# Patient Record
Sex: FEMALE | Race: WHITE | NOT HISPANIC OR LATINO | Employment: FULL TIME | URBAN - NONMETROPOLITAN AREA
[De-identification: names, ages, dates, MRNs, and addresses within clinical notes are randomized per-mention and may not be internally consistent; named-entity substitution may affect disease eponyms.]

---

## 2018-09-10 ENCOUNTER — OFFICE VISIT (OUTPATIENT)
Dept: RETAIL CLINIC | Facility: CLINIC | Age: 38
End: 2018-09-10

## 2018-09-10 VITALS
HEART RATE: 98 BPM | DIASTOLIC BLOOD PRESSURE: 96 MMHG | OXYGEN SATURATION: 98 % | TEMPERATURE: 98 F | SYSTOLIC BLOOD PRESSURE: 140 MMHG

## 2018-09-10 DIAGNOSIS — B01.9 VARICELLA WITHOUT COMPLICATION: Primary | ICD-10-CM

## 2018-09-10 PROCEDURE — 99213 OFFICE O/P EST LOW 20 MIN: CPT | Performed by: NURSE PRACTITIONER

## 2018-09-10 RX ORDER — ACYCLOVIR 400 MG/1
800 TABLET ORAL 4 TIMES DAILY
Qty: 40 TABLET | Refills: 0 | Status: SHIPPED | OUTPATIENT
Start: 2018-09-10 | End: 2018-09-15

## 2018-09-10 NOTE — PROGRESS NOTES
"Subjective   Luly Diamond is a 38 y.o. female.     Luly presents today with a rash.  She reports that she started to feel bad on Thursday or Friday of last week with bad headache, malaise, and possible fever.  She participated in a 5k walk and felt this was difficult for her, which she felt was odd because she walks frequently.  She noticed that day a small itchy rash to her left upper abdomen.  Last night she noticed that rash had worsened and spread to legs, arms, face, and back.  She denies any new exposures to medication, foods, lotions, etc.  She does report a friend of hers has recently been diagnosed with shingles and she was around this person on the Wednesday just prior to her symptoms starting but also the week prior.  She has never had a case of chicken pox and has not been vaccinated for this.  She reports if she scratches lesions, they will open to drain a \"pus\" which she describes as a white/clear, thin drainage.       Rash   This is a new problem. The current episode started in the past 7 days. The rash is diffuse. The rash is characterized by itchiness. She was exposed to an ill contact (Exposed to friend with Shingles more than once over the past 1-2 weeks.). Associated symptoms include a fever (possible). Pertinent negatives include no diarrhea, rhinorrhea, sore throat or vomiting. (Malaise  ) Past treatments include nothing.        The following portions of the patient's history were reviewed and updated as appropriate: allergies, current medications, past family history, past medical history, past social history, past surgical history and problem list.    Review of Systems   Constitutional: Positive for fever (possible).   HENT: Negative for rhinorrhea and sore throat.    Eyes: Positive for itching.   Respiratory: Negative.    Gastrointestinal: Negative for diarrhea, nausea and vomiting.   Skin: Positive for rash.   Neurological: Positive for headache.       Objective   Physical Exam "   Constitutional: She appears well-developed and well-nourished. She does not appear ill. No distress.   HENT:   Right Ear: External ear normal. Tympanic membrane is not erythematous and not bulging.   Left Ear: External ear normal. Tympanic membrane is not erythematous and not bulging.   Mouth/Throat: Oropharynx is clear and moist and mucous membranes are normal. No oral lesions. No oropharyngeal exudate.   Neck: Neck supple.   Cardiovascular: Normal rate, regular rhythm and normal heart sounds.  Exam reveals no gallop and no friction rub.    No murmur heard.  Pulmonary/Chest: Effort normal and breath sounds normal. No respiratory distress. She has no wheezes. She has no rales.   Lymphadenopathy:     She has no cervical adenopathy (Tonsillar nodes palpable).   Neurological: She is alert.   Skin: Skin is warm and dry. Rash noted. Rash is macular, maculopapular and vesicular. She is not diaphoretic.   First lesions to left upper abdomen, small maculopapular with crusted center. 2-3 lesions in this area.  Right side of face, noticed 1 maculopapular area and 1 macular lesion.  Bilateral lower extremities there are several small vesicular lesions. 2 digits to the right foot present with 2 flat macular lesions. Different stages present    Psychiatric: She has a normal mood and affect. Her behavior is normal.         Assessment/Plan   Luly was seen today for rash.    Diagnoses and all orders for this visit:    Varicella without complication    Other orders  -     acyclovir (ZOVIRAX) 400 MG tablet; Take 2 tablets by mouth 4 (Four) Times a Day for 5 days. Take no more than 5 doses a day.      Patient instructions given regarding chicken pox illness and risks of spreading.    May use antihistamine for itchiness; Ibuprofen and/or Tylenol for headache  Monitor symptoms.  If there is eye involvement, shortness of breath, coughing up sputum, worsening headache, or fever returns. Needs to be re-evaluated asap.  Work note  given for this week.  Informed she is contagious until all lesions are crusted over.

## 2018-09-10 NOTE — PATIENT INSTRUCTIONS
"Airborne Precautions  Germs that spread through the air are called airborne germs. Airborne precautions are rules that help to keep those germs from spreading from one person to another person.  Rules for patients  · You will be treated in a private room.  ? Keep the door to your room closed.  ? Wear a mask in your room if told by your nurse.  ? Check with your nurse before you leave your room.  · Wear a mask if you go to another part of the hospital or clinic. Make sure the mask fits tightly.  · Cover your mouth with a tissue when you cough.  · Cover your mouth and nose with a tissue when you sneeze.  · Wash your hands often with soap and water. If you cannot use soap and water, use hand .  Rules for visitors  · Check with a nurse before you go into a room that has a sign that says \"Airborne Precautions.\"  · If a nurse says that you can go in the room, wash your hands and wear a mask over your nose and mouth. Make sure the mask fits tightly.  · Do not take off your mask in the room.  · Do not eat or drink in the room.  · Do not use or touch any items in the room unless you ask a nurse first.  · Right after you leave the room:  1. Take off your mask. Throw it in the trash.  2. Wash your hands with soap and water. If you cannot use soap and water, use hand .  Summary  · Airborne precautions are rules to help prevent airborne germs from spreading.  · Wearing a mask and washing hands can help protect you and others from getting sick.  This information is not intended to replace advice given to you by your health care provider. Make sure you discuss any questions you have with your health care provider.  Document Released: 06/05/2009 Document Revised: 08/22/2017 Document Reviewed: 08/22/2017  [employer] Patient Education © 2017 [employer]      Chickenpox, Adult  Chickenpox is an infection that is caused by the varicella-zoster virus. The infection causes an itchy rash that turns into " blisters, which eventually scab over. This virus spreads easily from person to person (is contagious). It starts to be contagious 1-2 days before the rash appears. It remains contagious until the blisters become crusted. Chickenpox can be very serious for adults. Complications of chickenpox include:  · Pneumonia.  · Skin infection.  · Brain infection (encephalitis).  · A bloodstream infection (sepsis).  · Bleeding problems.  · Problems with balance and muscle control (cerebellar ataxia).  · Having a baby with a birth defect, if you are pregnant.    If you have had chickenpox once, you probably will not get it again. If you are exposed to chickenpox and have never had the illness or the vaccine, you may develop the illness within 21 days.  What are the causes?  This condition is caused by the spread of the virus. The virus may be spread:  · When an infected person coughs or sneezes, releasing tiny droplets into the air.  · When a person comes into contact with fluids from the chickenpox rash.    What increases the risk?  This condition is more likely to develop in:  · People who have never had chickenpox.  · People who have never been vaccinated.  · Health care workers.  · College students.  · People in the .  · People who live in an institution.  · Teachers.  · People who have a weak body defense system (immune system).    What are the signs or symptoms?  Symptoms of chickenpox are usually worse in adults and include:  · An itchy rash that changes over time.  ? The rash starts as red spots that become bumps.  ? The bumps turn into fluid-filled blisters.  ? The blisters turn into scabs, usually about 3-7 days after the rash begins.  · Body aches and pain.  · Headache.  · Tiredness.  · Fever.    How is this diagnosed?  This condition is diagnosed based on your medical history and symptoms, as well as a physical exam. You may also have blood tests or a culture of the rash to confirm the diagnosis.  How is this  treated?  Treatment for this condition may include:  · Taking medicine to shorten how long the illness lasts and to reduce its severity.  · Applying calamine lotion to relieve itchiness.  · Using baking soda or oatmeal baths to soothe itchy skin.  · Using a medicine that reduces itching (antihistamine).  · Taking antibiotic medicines if a bacterial infection develops.    Follow these instructions at home:  Pain, itching, and discomfort  · Try to stay cool and out of the sun. Sweating and being hot can make itching worse.  · Cool baths can be soothing. Try adding baking soda or oatmeal to the water to reduce itching. Do not bathe in hot water.  · Apply cool cloths (compresses) to itchy areas as told by your health care provider.  · Do not eat or drink spicy, salty, or acidic things if you have blisters in your mouth. Soft, bland, and cold foods and beverages will be easiest to swallow.  · Do not scratch or pick at the rash.  Medicines  · Take or apply over-the-counter and prescription medicines only as told by your health care provider. This includes any antihistamines.  · If you were prescribed an antibiotic medicine, take it as told by your health care provider. Do not stop using the medicine even if your condition improves.  Preventing infection  · While you are contagious, avoid being around:  ? Pregnant women.  ? Infants.  ? People receiving cancer treatments or long-term steroids.  ? People with weak immune systems.  ? Older people.  ? Anyone who has not had chickenpox.  ? Anyone who has not been vaccinated for chickenpox.  · Have any person who has been exposed to your chickenpox call a health care provider, who may recommend vaccination, if the person:  ? Has not had it before.  ? Has not been vaccinated against it.  ? Has a weak immune system.  ? Is pregnant.  · Wash your hands often. This lowers your chance of getting a bacterial skin infection and passing the virus to others.  General instructions  · Drink  enough fluid to keep your urine clear or pale yellow.  · Keep all follow-up visits as told by your health care provider. This is important.  How is this prevented?  Being vaccinated is the best way to prevent chickenpox.  Contact a health care provider if:  · You have a fever.  · You develop signs of infection. Watch for:  ? Yellowish-white fluid coming from rash blisters.  ? Areas of the skin that are warm, red, or tender.  · You develop a cough.  · Your urine is darker than normal.  Get help right away if:  · You cannot stop vomiting.  · You feel disoriented or confused.  · You are very sleepy.  · You have:  ? A stiff neck.  ? A seizure.  ? Trouble walking or keeping your balance.  ? Chest pain.  ? Trouble breathing or you have fast breathing.  ? Blood in your urine or stool.  ? Eye pain, red eyes, or decreased vision.  ? A severe headache.  ? Severe joint pain or stiffness.  · Your skin is bruising or your blisters are bleeding.  · You develop blisters in your eye.  · You have a fever and your symptoms suddenly get worse.  This information is not intended to replace advice given to you by your health care provider. Make sure you discuss any questions you have with your health care provider.  Document Released: 09/26/2009 Document Revised: 08/16/2017 Document Reviewed: 05/31/2017  [employer] Interactive Patient Education © 2018 [employer]

## 2023-09-06 ENCOUNTER — OFFICE VISIT (OUTPATIENT)
Dept: ENT CLINIC | Age: 43
End: 2023-09-06
Payer: COMMERCIAL

## 2023-09-06 ENCOUNTER — PROCEDURE VISIT (OUTPATIENT)
Dept: ENT CLINIC | Age: 43
End: 2023-09-06

## 2023-09-06 VITALS
HEIGHT: 72 IN | DIASTOLIC BLOOD PRESSURE: 74 MMHG | SYSTOLIC BLOOD PRESSURE: 128 MMHG | WEIGHT: 293 LBS | BODY MASS INDEX: 39.68 KG/M2

## 2023-09-06 DIAGNOSIS — H92.02 LEFT EAR PAIN: Primary | ICD-10-CM

## 2023-09-06 DIAGNOSIS — H60.392 CHRONIC BACTERIAL OTITIS EXTERNA OF LEFT EAR: Primary | ICD-10-CM

## 2023-09-06 PROCEDURE — NBSRV NON-BILLABLE SERVICE: Performed by: AUDIOLOGIST

## 2023-09-06 PROCEDURE — 99203 OFFICE O/P NEW LOW 30 MIN: CPT | Performed by: PHYSICIAN ASSISTANT

## 2023-09-06 RX ORDER — CEPHALEXIN 500 MG/1
CAPSULE ORAL
COMMUNITY
Start: 2023-08-25

## 2023-09-06 RX ORDER — EPINEPHRINE 0.3 MG/.3ML
INJECTION SUBCUTANEOUS
COMMUNITY
Start: 2021-12-15

## 2023-09-06 RX ORDER — MELOXICAM 15 MG/1
15 TABLET ORAL DAILY
COMMUNITY
Start: 2023-07-06

## 2023-09-06 RX ORDER — AMOXICILLIN AND CLAVULANATE POTASSIUM 875; 125 MG/1; MG/1
1 TABLET, FILM COATED ORAL 2 TIMES DAILY
Qty: 28 TABLET | Refills: 0 | Status: SHIPPED | OUTPATIENT
Start: 2023-09-06 | End: 2023-09-20

## 2023-09-06 RX ORDER — ESOMEPRAZOLE MAGNESIUM 20 MG/1
FOR SUSPENSION ORAL
COMMUNITY

## 2023-09-06 ASSESSMENT — ENCOUNTER SYMPTOMS
SINUS PRESSURE: 0
EYE DISCHARGE: 0
FACIAL SWELLING: 0
VOICE CHANGE: 0
TROUBLE SWALLOWING: 0
SINUS PAIN: 0
EYE PAIN: 0
RHINORRHEA: 0
SORE THROAT: 0

## 2023-09-06 NOTE — ASSESSMENT & PLAN NOTE
Chronic bacterial infection of the external left ear secondary to strep based on recent culture  Plan: I will place the patient on Augmentin for 14 days I will also add mupirocin ointment for MRSA coverage. Patient is to follow-up in 2 weeks for reevaluation. Once the infection has resolved, she is desires to consider excisional surgery of the fistula tract.

## 2023-09-06 NOTE — PROGRESS NOTES
History   Mandy Flynn is a 44 y.o. female who presented to the clinic this date with complaints of infected ear pit on her left ear. Audio not needed at this time.

## 2023-09-06 NOTE — PROGRESS NOTES
[place] [facility]/ENT  Giorgi Colmenares is a pleasant 44-year-old  female that was referred by Narinder Lambert due to problems with an external ear infection of the left ear. 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 She admits to having sporadic infection issues. The wound was recently cultured and demonstrated group A strep. She was treated with Keflex and Bactrim with no resolution. She reports that the Bactrim seemed to help more than the Keflex. Currently she complains of tenderness in the region with the drainage. Allergies: Maltodextrin maize [dextrin]      Current Outpatient Medications   Medication Sig Dispense Refill    esomeprazole Magnesium (NEXIUM) 20 MG PACK Take by mouth      meloxicam (MOBIC) 15 MG tablet Take 1 tablet by mouth daily      cephALEXin (KEFLEX) 500 MG capsule TAKE ONE CAPSULE BY MOUTH EVERY 6 HOURS FOR 10 DAYS      EPINEPHrine (EPIPEN) 0.3 MG/0.3ML SOAJ injection ADMINISTER 0.3 ML IN THE MUSCLE 1 TIME AS NEEDED FOR ANAPHYLACTIC REACTION. MAY REPEAT IN 15 MINUTES AS NEEDED      SEMAGLUTIDE PO Take by mouth      amoxicillin-clavulanate (AUGMENTIN) 875-125 MG per tablet Take 1 tablet by mouth 2 times daily for 14 days 28 tablet 0    mupirocin (BACTROBAN) 2 % ointment Apply topically 3 times daily. 15 g 0     No current facility-administered medications for this visit. Past Surgical History:   Procedure Laterality Date    CYST REMOVAL Left     shoulder    KNEE ARTHROPLASTY         Past Medical History:   Diagnosis Date    GERD (gastroesophageal reflux disease)        No family history on file. Social History     Tobacco Use    Smoking status: Never    Smokeless tobacco: Never   Substance Use Topics    Alcohol use: Not Currently           REVIEW OF SYSTEMS:  all other systems reviewed and are negative  Review of Systems   Constitutional:  Negative for chills and fever.    HENT:  Negative

## 2023-09-27 ENCOUNTER — OFFICE VISIT (OUTPATIENT)
Dept: ENT CLINIC | Age: 43
End: 2023-09-27
Payer: COMMERCIAL

## 2023-09-27 VITALS — SYSTOLIC BLOOD PRESSURE: 130 MMHG | WEIGHT: 293 LBS | BODY MASS INDEX: 42.04 KG/M2 | DIASTOLIC BLOOD PRESSURE: 82 MMHG

## 2023-09-27 DIAGNOSIS — H61.92 LESION OF EXTERNAL EAR CANAL, LEFT: Primary | ICD-10-CM

## 2023-09-27 PROCEDURE — 99213 OFFICE O/P EST LOW 20 MIN: CPT | Performed by: PHYSICIAN ASSISTANT

## 2023-09-27 NOTE — PROGRESS NOTES
Bria Harris is a pleasant 41-year-old  female that presents for a 2-week follow-up after treatment for an abscess to the left auricular region as well as left otitis externa. She reports that the ear canal feels back to normal but she is still having lots of pain and drainage from the abscess. She has a history of having a chronic sinus tract that has been present since she was a child. She reports that she is completed her oral antibiotics and cream and has noticed no major changes. Physical examination demonstrated the patient to have a abscess to the left inner ear and measures about 0.5 cm in size. No purulent material was appreciated or any erythema. This still appears to be somewhat ulcerative. Neck exam demonstrated no lymphadenopathy or thyromegaly. Oral exam was unremarkable. Impression: Persistent ulcerative lesion to the left ear-unresponsive to antibiotic therapy    Plan: The risks, benefits, and options were discussed with the patient and her . I recommended excision of the lesion under local anesthesia. She is agreeable and wishes to proceed. This is currently scheduled for October 13. Patient was reminded to call if this worsens or she has any questions.       Electronically signed by Yu Willard PA-C on 9/27/23 at 12:13 PM CDT\

## 2023-10-09 ENCOUNTER — TELEPHONE (OUTPATIENT)
Dept: ENT CLINIC | Age: 43
End: 2023-10-09

## 2023-10-09 RX ORDER — BACITRACIN ZINC AND POLYMYXIN B SULFATE 500; 1000 [USP'U]/G; [USP'U]/G
OINTMENT TOPICAL
Qty: 15 G | Refills: 0 | Status: SHIPPED | OUTPATIENT
Start: 2023-10-09 | End: 2023-10-16

## 2023-10-09 RX ORDER — CLINDAMYCIN HYDROCHLORIDE 300 MG/1
300 CAPSULE ORAL 3 TIMES DAILY
Qty: 30 CAPSULE | Refills: 0 | Status: SHIPPED | OUTPATIENT
Start: 2023-10-09 | End: 2023-10-19

## 2023-10-09 NOTE — TELEPHONE ENCOUNTER
VM: Pt is c/o purulent drainage and would like antibiotic as discussed prior to Friday in office procedure. Patient requested antibiotic due to infected lesion of the external ear.   We will do a trial of clindamycin and polymyxin B      Electronically signed by Verena Brown PA-C on 10/9/23 at 6:09 PM CDT

## 2023-10-13 ENCOUNTER — PROCEDURE VISIT (OUTPATIENT)
Dept: ENT CLINIC | Age: 43
End: 2023-10-13

## 2023-10-13 VITALS
WEIGHT: 293 LBS | SYSTOLIC BLOOD PRESSURE: 148 MMHG | HEIGHT: 72 IN | BODY MASS INDEX: 39.68 KG/M2 | DIASTOLIC BLOOD PRESSURE: 90 MMHG

## 2023-10-13 DIAGNOSIS — H61.92 LESION OF EXTERNAL EAR CANAL, LEFT: ICD-10-CM

## 2023-10-13 DIAGNOSIS — Z98.890 S/P EAR SURGERY: Primary | ICD-10-CM

## 2023-10-13 DIAGNOSIS — H61.92 SKIN LESION OF LEFT EXTERNAL EAR: ICD-10-CM

## 2023-10-13 RX ORDER — TRAMADOL HYDROCHLORIDE 50 MG/1
50 TABLET ORAL EVERY 6 HOURS PRN
Qty: 20 TABLET | Refills: 0 | Status: SHIPPED | OUTPATIENT
Start: 2023-10-13 | End: 2023-10-18

## 2023-10-13 NOTE — PROGRESS NOTES
[facility] OTOLARYNGOLOG[facility]/ENT        PREOP NOTE   [place] is a pleasant 46-year-old  female that I previously seen due to a chronically draining fistula tract to the external ear. She was treated with several rounds of antibiotics with the track continuing to recur. Due to this ongoing issue, the risks, benefits, and options were discussed with the patient. She was agreeable for excisional surgery under local anesthesia. PROCEDURE NOTE:  After informed consent was obtained, the lesion was anesthetized with 1% lidocaine. The site was prepped and draped in a sterile fashion. An elliptical incision was made around the lesion with dissection taken to the cartilage of the ear. After excision the lesion measured approximately 1 cm in size. The specimen was placed in formalin for pathological analysis. The open wound site was noted to have granulation tissue tracking into the deep wounds. I was able to scrape the granulation tissue with a 15 blade scalpel without any complications. After the granulation tissue was removed, the patient was noted to have no active bleeding. The open wound was closed with 5 interrupted 5-0 Prolene sutures without any complications. At the completion of the procedure the wound was cleaned with peroxide and dried. Mupirocin ointment was applied to the wound surface. Wound care instructions were discussed with the patient. She is to follow-up in 7 to 10 days for suture removal.  I will prescribe tramadol for postop pain. Patient was reminded to call if she has any questions or problems. I will call her the pathological report once this has been completed.       Electronically signed by Ruben Lao PA-C on 10/13/23 at 12:08 PM [facility]

## 2023-10-16 ENCOUNTER — TELEPHONE (OUTPATIENT)
Dept: ENT CLINIC | Age: 43
End: 2023-10-16

## 2023-10-16 NOTE — TELEPHONE ENCOUNTER
Pathology results called to the patient's cell phone voicemail. Patient was noted with no evidence of malignancy. The specimen was consistent with a resolving abscess. Patient was advised to call if she has questions.   She is to see me in 10 days for suture removal.      Electronically signed by Dinah Griffin PA-C on 10/16/23 at 5:42 PM CDT

## 2023-10-23 ENCOUNTER — OFFICE VISIT (OUTPATIENT)
Dept: ENT CLINIC | Age: 43
End: 2023-10-23

## 2023-10-23 VITALS
HEIGHT: 72 IN | BODY MASS INDEX: 19.05 KG/M2 | SYSTOLIC BLOOD PRESSURE: 128 MMHG | WEIGHT: 140.6 LBS | DIASTOLIC BLOOD PRESSURE: 82 MMHG

## 2023-10-23 DIAGNOSIS — Z98.890 S/P EAR SURGERY: Primary | ICD-10-CM

## 2023-10-23 PROBLEM — H60.392: Status: RESOLVED | Noted: 2023-09-06 | Resolved: 2023-10-23

## 2023-10-23 PROCEDURE — 99024 POSTOP FOLLOW-UP VISIT: CPT | Performed by: PHYSICIAN ASSISTANT

## 2023-10-23 NOTE — PROGRESS NOTES
Dwaine Blum is a pleasant 81-year-old  female that presents for a 10-day follow-up after excision of a lesion to the external ear. Pathological analysis of the lesion demonstrated evidence of a chronic abscess. There was no evidence of malignancy. Currently she reports that the ear is feeling better and only soreness is secondary to her sutures. She denies any drainage or any erythematous changes to the skin. Physical examination demonstrated the incisional site to be completely healed with no gapping. The Prolene sutures were removed without any complications. She was noted to have 1 area that was somewhat suspicious for a possible superficial track. I probed this with a Adson and demonstrated no evidence of recurrent fistula. Wound care instructions were discussed with the patient. I will have her to use antibiotic ointment for the next 5 to 7 days until the wound has been completely healed. Impression: Doing well status post excision of left ear lesion consistent with chronic abscess    Plan: Patient is to follow-up with me as needed. She was reminded to call if she has any questions or problems.       Electronically signed by Mojgan Le PA-C on 10/23/23 at 11:35 AM CDT'

## 2023-10-26 ENCOUNTER — TELEPHONE (OUTPATIENT)
Dept: ENT CLINIC | Age: 43
End: 2023-10-26

## 2023-10-26 RX ORDER — CEPHALEXIN 500 MG/1
500 CAPSULE ORAL 3 TIMES DAILY
Qty: 30 CAPSULE | Refills: 0 | Status: SHIPPED | OUTPATIENT
Start: 2023-10-26 | End: 2023-11-05

## 2023-10-26 NOTE — TELEPHONE ENCOUNTER
Send ointment to [facility] for [employer] . Pt stopped in office today. Patient was seen today as a walk-in due to a residual stitch that was removed from the left ear with no complications. She is still noted to have edema some mild erythema from the wound site. I will place her on 10 days of antibiotics and continue the ointment. Patient was advised to call if this does not improve.       Electronically signed by Beny Farley PA-C on 10/26/23 at 4:56 PM [facility]AN[facility]

## 2024-02-27 ENCOUNTER — OFFICE VISIT (OUTPATIENT)
Dept: ENT CLINIC | Age: 44
End: 2024-02-27
Payer: COMMERCIAL

## 2024-02-27 VITALS
DIASTOLIC BLOOD PRESSURE: 74 MMHG | BODY MASS INDEX: 39.68 KG/M2 | WEIGHT: 293 LBS | HEIGHT: 72 IN | SYSTOLIC BLOOD PRESSURE: 138 MMHG

## 2024-02-27 DIAGNOSIS — H61.92 SKIN LESION OF LEFT EXTERNAL EAR: Primary | ICD-10-CM

## 2024-02-27 PROCEDURE — 99212 OFFICE O/P EST SF 10 MIN: CPT | Performed by: PHYSICIAN ASSISTANT

## 2024-02-27 RX ORDER — METHYLPREDNISOLONE 4 MG/1
TABLET ORAL
COMMUNITY
Start: 2024-02-26

## 2024-02-27 RX ORDER — DOXYCYCLINE HYCLATE 100 MG/1
100 CAPSULE ORAL 2 TIMES DAILY
Qty: 28 CAPSULE | Refills: 0 | Status: SHIPPED | OUTPATIENT
Start: 2024-02-27 | End: 2024-03-12

## 2024-02-27 NOTE — PROGRESS NOTES
Faye is a pleasant 43-year-old  female that presents with complaints of her ear lesion returning to the left auricular region.  She had previously underwent excision of the lesion under local anesthesia here at the office.  She reports that approximately 2 months ago she noticed the swelling to return.  She denies any drainage from the lesion.  She reports of hardness that has been occurring inferior to the lesion but seems to be different compared to the previous encounter.  She denies any issues with fever, chills, or drainage.      Physical examination revealed a recurrent inclusion cyst to the left inner auricular region.  She is noted to have some firmness below the visualized lesion given question of the inclusion cyst extending deeper into the ear.  Currently this is mildly tender with no erythematous changes of the skin.  No drainage was noted.  Neck exam demonstrated no lymphadenopathy or thyromegaly.  The inner ear exam demonstrated no evidence of any abnormalities.      Impression: Recurrent inclusion cyst of the left ear    Plan: I will place the patient on a 14-day course of doxycycline.  Treatment options were discussed with the patient.  I recommended the patient see Dr. Adams for consideration of reexcision of the lesion under anesthesia.  Patient is agreeable and wishes to proceed.  I reminded the patient to call if the antibiotics does not help with the infection status.  She is reminded call she has any questions or concerns.      Electronically signed by LARRY MCCOLLUM PA-C on 2/27/24 at 12:22 PM CST

## 2024-04-15 ENCOUNTER — OFFICE VISIT (OUTPATIENT)
Dept: ENT CLINIC | Age: 44
End: 2024-04-15
Payer: COMMERCIAL

## 2024-04-15 VITALS
SYSTOLIC BLOOD PRESSURE: 138 MMHG | BODY MASS INDEX: 39.68 KG/M2 | WEIGHT: 293 LBS | DIASTOLIC BLOOD PRESSURE: 82 MMHG | HEIGHT: 72 IN

## 2024-04-15 DIAGNOSIS — Q18.1 PREAURICULAR SINUS AND CYST: Primary | ICD-10-CM

## 2024-04-15 PROCEDURE — 99214 OFFICE O/P EST MOD 30 MIN: CPT | Performed by: OTOLARYNGOLOGY

## 2024-04-15 ASSESSMENT — ENCOUNTER SYMPTOMS
ALLERGIC/IMMUNOLOGIC NEGATIVE: 1
RESPIRATORY NEGATIVE: 1
GASTROINTESTINAL NEGATIVE: 1
EYES NEGATIVE: 1

## 2024-04-15 NOTE — PROGRESS NOTES
4/15/2024    Faye Nguyen (:  1980) is a 43 y.o. female, Established patient, here for evaluation of the following chief complaint(s):  New Patient (External ear lesion)      Vitals:    04/15/24 1051   BP: 138/82   Weight: (!) 148.3 kg (327 lb)   Height: 1.854 m (6' 1\")       Wt Readings from Last 3 Encounters:   04/15/24 (!) 148.3 kg (327 lb)   24 (!) 149.2 kg (329 lb)   10/23/23 63.8 kg (140 lb 9.6 oz)       BP Readings from Last 3 Encounters:   04/15/24 138/82   24 138/74   10/23/23 128/82         SUBJECTIVE/OBJECTIVE:    Patient seen today for her left preauricular region.  She said most of her life she has had a pit there.  It never really cause any issues until recently when it became swollen.  Michael Xiao drained it but it came back and radiated down into her ear somewhat.  No pain.  She says it still drains.        Review of Systems   Constitutional: Negative.    HENT: Negative.     Eyes: Negative.    Respiratory: Negative.     Cardiovascular: Negative.    Gastrointestinal: Negative.    Endocrine: Negative.    Musculoskeletal: Negative.    Skin: Negative.    Allergic/Immunologic: Negative.    Neurological: Negative.    Hematological: Negative.    Psychiatric/Behavioral: Negative.          Physical Exam  Vitals reviewed.   Constitutional:       Appearance: Normal appearance. She is normal weight.   HENT:      Head: Normocephalic and atraumatic.      Right Ear: Tympanic membrane, ear canal and external ear normal.      Left Ear: Tympanic membrane, ear canal and external ear normal.      Ears:        Comments: Preauricular pit with mild fullness left     Nose: Nose normal.      Mouth/Throat:      Mouth: Mucous membranes are moist.      Pharynx: Oropharynx is clear.   Eyes:      Extraocular Movements: Extraocular movements intact.      Pupils: Pupils are equal, round, and reactive to light.   Cardiovascular:      Rate and Rhythm: Normal rate and regular rhythm.   Pulmonary:

## 2024-05-23 ENCOUNTER — TELEPHONE (OUTPATIENT)
Dept: ENT CLINIC | Age: 44
End: 2024-05-23

## 2024-05-23 ENCOUNTER — HOSPITAL ENCOUNTER (OUTPATIENT)
Dept: CT IMAGING | Age: 44
Discharge: HOME | End: 2024-05-23
Attending: OTOLARYNGOLOGY
Payer: COMMERCIAL

## 2024-05-23 DIAGNOSIS — Q18.1 PREAURICULAR SINUS AND CYST: ICD-10-CM

## 2024-05-23 PROCEDURE — 70491 CT SOFT TISSUE NECK W/DYE: CPT

## 2024-05-23 PROCEDURE — 6360000004 HC RX CONTRAST MEDICATION: Performed by: OTOLARYNGOLOGY

## 2024-05-23 RX ADMIN — IOPAMIDOL 75 ML: 755 INJECTION, SOLUTION INTRAVENOUS at 15:47

## 2024-05-23 NOTE — TELEPHONE ENCOUNTER
Faye called to schedule a follow up after [facility] was unable to accommodate.    Please be advised that the best time to call her to accommodate their needs is Anytime.     Thank you.

## 2024-07-25 ENCOUNTER — OFFICE VISIT (OUTPATIENT)
Dept: ENT CLINIC | Age: 44
End: 2024-07-25
Payer: COMMERCIAL

## 2024-07-25 ENCOUNTER — PREP FOR PROCEDURE (OUTPATIENT)
Dept: ENT CLINIC | Age: 44
End: 2024-07-25

## 2024-07-25 VITALS
SYSTOLIC BLOOD PRESSURE: 136 MMHG | HEIGHT: 72 IN | WEIGHT: 293 LBS | BODY MASS INDEX: 39.68 KG/M2 | DIASTOLIC BLOOD PRESSURE: 82 MMHG

## 2024-07-25 DIAGNOSIS — Q18.1 PREAURICULAR CYST: Primary | ICD-10-CM

## 2024-07-25 DIAGNOSIS — Q18.1 PREAURICULAR CYST: ICD-10-CM

## 2024-07-25 PROCEDURE — 99214 OFFICE O/P EST MOD 30 MIN: CPT | Performed by: OTOLARYNGOLOGY

## 2024-07-25 RX ORDER — TIRZEPATIDE 2.5 MG/.5ML
INJECTION, SOLUTION SUBCUTANEOUS
COMMUNITY
Start: 2024-07-11

## 2024-07-25 ASSESSMENT — ENCOUNTER SYMPTOMS
EYES NEGATIVE: 1
GASTROINTESTINAL NEGATIVE: 1
ALLERGIC/IMMUNOLOGIC NEGATIVE: 1
RESPIRATORY NEGATIVE: 1

## 2024-07-25 NOTE — PROGRESS NOTES
2024    Faye Nguyen (:  1980) is a 44 y.o. female, Established patient, here for evaluation of the following chief complaint(s):  Follow-up (F/u after CT)      Vitals:    24 1438   BP: 136/82   Weight: (!) 145.6 kg (321 lb)   Height: 1.854 m (6' 1\")       Wt Readings from Last 3 Encounters:   24 (!) 145.6 kg (321 lb)   04/15/24 (!) 148.3 kg (327 lb)   24 (!) 149.2 kg (329 lb)       BP Readings from Last 3 Encounters:   24 136/82   04/15/24 138/82   24 138/74         SUBJECTIVE/OBJECTIVE:    Patient seen today for preauricular cyst on the left.  Ordered a CT scan which shows no significant abnormalities.  She is interested to have this removed as it often gets infected.        Review of Systems   Constitutional: Negative.    HENT: Negative.     Eyes: Negative.    Respiratory: Negative.     Cardiovascular: Negative.    Gastrointestinal: Negative.    Endocrine: Negative.    Musculoskeletal: Negative.    Skin: Negative.    Allergic/Immunologic: Negative.    Neurological: Negative.    Hematological: Negative.    Psychiatric/Behavioral: Negative.          Physical Exam  Vitals reviewed.   Constitutional:       Appearance: Normal appearance. She is normal weight.   HENT:      Head: Normocephalic and atraumatic.      Right Ear: Tympanic membrane, ear canal and external ear normal.      Left Ear: Tympanic membrane, ear canal and external ear normal.      Ears:        Nose: Nose normal.      Mouth/Throat:      Mouth: Mucous membranes are moist.      Pharynx: Oropharynx is clear.   Eyes:      Extraocular Movements: Extraocular movements intact.      Pupils: Pupils are equal, round, and reactive to light.   Cardiovascular:      Rate and Rhythm: Normal rate and regular rhythm.   Pulmonary:      Effort: Pulmonary effort is normal.      Breath sounds: Normal breath sounds.   Musculoskeletal:      Cervical back: Normal range of motion.   Skin:     General: Skin is warm and

## 2024-08-30 ENCOUNTER — HOSPITAL ENCOUNTER (OUTPATIENT)
Dept: PREADMISSION TESTING | Age: 44
Discharge: HOME | End: 2024-09-03

## 2024-08-30 RX ORDER — LEVOTHYROXINE SODIUM 125 UG/1
125 TABLET ORAL DAILY
COMMUNITY
End: 2024-11-18 | Stop reason: DRUGHIGH

## 2024-09-24 ENCOUNTER — TELEPHONE (OUTPATIENT)
Dept: ENT CLINIC | Age: 44
End: 2024-09-24

## 2024-10-10 ENCOUNTER — OFFICE VISIT (OUTPATIENT)
Dept: ENT CLINIC | Age: 44
End: 2024-10-10
Payer: COMMERCIAL

## 2024-10-10 VITALS
BODY MASS INDEX: 39.42 KG/M2 | SYSTOLIC BLOOD PRESSURE: 130 MMHG | HEIGHT: 72 IN | WEIGHT: 291 LBS | DIASTOLIC BLOOD PRESSURE: 78 MMHG

## 2024-10-10 DIAGNOSIS — Q18.1 CYST OF LEFT PREAURICULAR REGION: Primary | ICD-10-CM

## 2024-10-10 PROCEDURE — 99214 OFFICE O/P EST MOD 30 MIN: CPT | Performed by: OTOLARYNGOLOGY

## 2024-10-10 ASSESSMENT — ENCOUNTER SYMPTOMS
RESPIRATORY NEGATIVE: 1
GASTROINTESTINAL NEGATIVE: 1
EYES NEGATIVE: 1
ALLERGIC/IMMUNOLOGIC NEGATIVE: 1

## 2024-10-10 NOTE — PROGRESS NOTES
Neurological:      General: No focal deficit present.      Mental Status: She is alert and oriented to person, place, and time.   Psychiatric:         Mood and Affect: Mood normal.         Behavior: Behavior normal.              ASSESSMENT/PLAN:    1. Cyst of left preauricular region  Antibiotics today and risk and benefits reviewed and she would like to proceed.    Return in about 10 weeks (around 12/19/2024).    An electronic signature was used to authenticate this note.    Jaret Adams MD       Please note that this chart was generated using dragon dictation software.  Although every effort was made to ensure the accuracy of this automated transcription, some errors in transcription may have occurred.

## 2024-11-18 ENCOUNTER — TELEPHONE (OUTPATIENT)
Dept: ENT CLINIC | Age: 44
End: 2024-11-18

## 2024-11-18 ENCOUNTER — HOSPITAL ENCOUNTER (OUTPATIENT)
Dept: PREADMISSION TESTING | Age: 44
Discharge: HOME | End: 2024-11-22

## 2024-11-18 VITALS — WEIGHT: 279 LBS | BODY MASS INDEX: 36.81 KG/M2

## 2024-11-18 RX ORDER — LEVOTHYROXINE SODIUM 137 UG/1
137 TABLET ORAL DAILY
COMMUNITY

## 2024-11-18 NOTE — TELEPHONE ENCOUNTER
Attempted to return call twice but phone would not ring. Not sure if call went through or not. Dr. Adams reviewed current medication list that the office has and said no medications need to be stopped.

## 2024-11-18 NOTE — TELEPHONE ENCOUNTER
Faye requests that a nurse return their call. The best time to reach her is Anytime. Faye is calling to speak to a nurse regarding which medications she can and cannot take prior to her upcoming surgery. Please contact Faye to advise.    Thank you.

## 2024-11-21 DIAGNOSIS — G89.18 POSTOPERATIVE PAIN: Primary | ICD-10-CM

## 2024-11-21 RX ORDER — OXYCODONE AND ACETAMINOPHEN 5; 325 MG/1; MG/1
1 TABLET ORAL EVERY 6 HOURS PRN
Qty: 12 TABLET | Refills: 0 | Status: SHIPPED | OUTPATIENT
Start: 2024-11-21 | End: 2024-11-24

## 2024-11-21 ASSESSMENT — ENCOUNTER SYMPTOMS
EYES NEGATIVE: 1
ALLERGIC/IMMUNOLOGIC NEGATIVE: 1
GASTROINTESTINAL NEGATIVE: 1
RESPIRATORY NEGATIVE: 1

## 2024-11-21 NOTE — H&P
2024    Faye Nguyen (:  1980) is a 44 y.o. female, Established patient, here for evaluation of the following chief complaint(s):  No chief complaint on file.      Vitals:    24 1052   Weight: 135.9 kg (299 lb 8 oz)   Height: 1.854 m (6' 1\")       Wt Readings from Last 3 Encounters:   24 126.6 kg (279 lb)   10/10/24 132 kg (291 lb)   24 (!) 145.6 kg (321 lb)       BP Readings from Last 3 Encounters:   10/10/24 130/78   24 136/82   04/15/24 138/82         SUBJECTIVE/OBJECTIVE:    Patient seen today for left preauricular cyst.  She says it somewhat inflamed and draining a little bit.  She is on the schedule next month for excision.        Review of Systems   Constitutional: Negative.    HENT: Negative.     Eyes: Negative.    Respiratory: Negative.     Cardiovascular: Negative.    Gastrointestinal: Negative.    Endocrine: Negative.    Musculoskeletal: Negative.    Skin: Negative.    Allergic/Immunologic: Negative.    Neurological: Negative.    Hematological: Negative.    Psychiatric/Behavioral: Negative.          Physical Exam  Vitals reviewed.   Constitutional:       Appearance: Normal appearance. She is normal weight.   HENT:      Head: Normocephalic and atraumatic.      Right Ear: Tympanic membrane, ear canal and external ear normal.      Left Ear: Tympanic membrane, ear canal and external ear normal.      Ears:        Comments: Inflamed cyst     Nose: Nose normal.      Mouth/Throat:      Mouth: Mucous membranes are moist.      Pharynx: Oropharynx is clear.   Eyes:      Extraocular Movements: Extraocular movements intact.      Pupils: Pupils are equal, round, and reactive to light.   Cardiovascular:      Rate and Rhythm: Normal rate and regular rhythm.   Pulmonary:      Effort: Pulmonary effort is normal.      Breath sounds: Normal breath sounds.   Musculoskeletal:      Cervical back: Normal range of motion.   Skin:     General: Skin is warm and dry.

## 2024-11-22 ENCOUNTER — ANESTHESIA (OUTPATIENT)
Dept: OPERATING ROOM | Age: 44
End: 2024-11-22
Payer: COMMERCIAL

## 2024-11-22 ENCOUNTER — ANESTHESIA EVENT (OUTPATIENT)
Dept: OPERATING ROOM | Age: 44
End: 2024-11-22
Payer: COMMERCIAL

## 2024-11-22 ENCOUNTER — HOSPITAL ENCOUNTER (OUTPATIENT)
Age: 44
Setting detail: OUTPATIENT SURGERY
Discharge: HOME | End: 2024-11-22
Attending: OTOLARYNGOLOGY | Admitting: OTOLARYNGOLOGY
Payer: COMMERCIAL

## 2024-11-22 VITALS
WEIGHT: 293 LBS | SYSTOLIC BLOOD PRESSURE: 120 MMHG | TEMPERATURE: 97.2 F | RESPIRATION RATE: 12 BRPM | DIASTOLIC BLOOD PRESSURE: 75 MMHG | HEART RATE: 58 BPM | OXYGEN SATURATION: 95 % | BODY MASS INDEX: 39.68 KG/M2 | HEIGHT: 72 IN

## 2024-11-22 DIAGNOSIS — Q18.1 PREAURICULAR CYST: ICD-10-CM

## 2024-11-22 LAB
HCG, URINE, POC: NEGATIVE
Lab: NORMAL
NEGATIVE QC PASS/FAIL: NORMAL
POSITIVE QC PASS/FAIL: NORMAL

## 2024-11-22 PROCEDURE — 3700000001 HC ADD 15 MINUTES (ANESTHESIA): Performed by: OTOLARYNGOLOGY

## 2024-11-22 PROCEDURE — 2709999900 HC NON-CHARGEABLE SUPPLY: Performed by: OTOLARYNGOLOGY

## 2024-11-22 PROCEDURE — 7100000001 HC PACU RECOVERY - ADDTL 15 MIN: Performed by: OTOLARYNGOLOGY

## 2024-11-22 PROCEDURE — 2500000003 HC RX 250 WO HCPCS: Performed by: NURSE ANESTHETIST, CERTIFIED REGISTERED

## 2024-11-22 PROCEDURE — 3600000003 HC SURGERY LEVEL 3 BASE: Performed by: OTOLARYNGOLOGY

## 2024-11-22 PROCEDURE — 3700000000 HC ANESTHESIA ATTENDED CARE: Performed by: OTOLARYNGOLOGY

## 2024-11-22 PROCEDURE — 7100000011 HC PHASE II RECOVERY - ADDTL 15 MIN: Performed by: OTOLARYNGOLOGY

## 2024-11-22 PROCEDURE — 6360000002 HC RX W HCPCS: Performed by: ANESTHESIOLOGY

## 2024-11-22 PROCEDURE — 6360000002 HC RX W HCPCS: Performed by: OTOLARYNGOLOGY

## 2024-11-22 PROCEDURE — 6360000002 HC RX W HCPCS: Performed by: NURSE ANESTHETIST, CERTIFIED REGISTERED

## 2024-11-22 PROCEDURE — 88304 TISSUE EXAM BY PATHOLOGIST: CPT

## 2024-11-22 PROCEDURE — 3600000013 HC SURGERY LEVEL 3 ADDTL 15MIN: Performed by: OTOLARYNGOLOGY

## 2024-11-22 PROCEDURE — 7100000010 HC PHASE II RECOVERY - FIRST 15 MIN: Performed by: OTOLARYNGOLOGY

## 2024-11-22 PROCEDURE — 7100000000 HC PACU RECOVERY - FIRST 15 MIN: Performed by: OTOLARYNGOLOGY

## 2024-11-22 PROCEDURE — 2580000003 HC RX 258: Performed by: ANESTHESIOLOGY

## 2024-11-22 PROCEDURE — 6370000000 HC RX 637 (ALT 250 FOR IP): Performed by: OTOLARYNGOLOGY

## 2024-11-22 RX ORDER — DEXAMETHASONE SODIUM PHOSPHATE 10 MG/ML
INJECTION, SOLUTION INTRAMUSCULAR; INTRAVENOUS
Status: DISCONTINUED | OUTPATIENT
Start: 2024-11-22 | End: 2024-11-22 | Stop reason: SDUPTHER

## 2024-11-22 RX ORDER — FENTANYL CITRATE 50 UG/ML
INJECTION, SOLUTION INTRAMUSCULAR; INTRAVENOUS
Status: DISCONTINUED | OUTPATIENT
Start: 2024-11-22 | End: 2024-11-22 | Stop reason: SDUPTHER

## 2024-11-22 RX ORDER — GINSENG 100 MG
CAPSULE ORAL PRN
Status: DISCONTINUED | OUTPATIENT
Start: 2024-11-22 | End: 2024-11-22 | Stop reason: ALTCHOICE

## 2024-11-22 RX ORDER — CEFAZOLIN SODIUM 1 G/3ML
INJECTION, POWDER, FOR SOLUTION INTRAMUSCULAR; INTRAVENOUS
Status: DISCONTINUED | OUTPATIENT
Start: 2024-11-22 | End: 2024-11-22 | Stop reason: SDUPTHER

## 2024-11-22 RX ORDER — LIDOCAINE HYDROCHLORIDE AND EPINEPHRINE 10; 10 MG/ML; UG/ML
INJECTION, SOLUTION INFILTRATION; PERINEURAL PRN
Status: DISCONTINUED | OUTPATIENT
Start: 2024-11-22 | End: 2024-11-22 | Stop reason: ALTCHOICE

## 2024-11-22 RX ORDER — SODIUM CHLORIDE, SODIUM LACTATE, POTASSIUM CHLORIDE, CALCIUM CHLORIDE 600; 310; 30; 20 MG/100ML; MG/100ML; MG/100ML; MG/100ML
INJECTION, SOLUTION INTRAVENOUS CONTINUOUS
Status: DISCONTINUED | OUTPATIENT
Start: 2024-11-22 | End: 2024-11-22 | Stop reason: HOSPADM

## 2024-11-22 RX ORDER — ONDANSETRON 2 MG/ML
INJECTION INTRAMUSCULAR; INTRAVENOUS
Status: DISCONTINUED | OUTPATIENT
Start: 2024-11-22 | End: 2024-11-22 | Stop reason: SDUPTHER

## 2024-11-22 RX ORDER — SODIUM CHLORIDE 0.9 % (FLUSH) 0.9 %
5-40 SYRINGE (ML) INJECTION PRN
Status: DISCONTINUED | OUTPATIENT
Start: 2024-11-22 | End: 2024-11-22 | Stop reason: HOSPADM

## 2024-11-22 RX ORDER — ONDANSETRON 2 MG/ML
4 INJECTION INTRAMUSCULAR; INTRAVENOUS
Status: COMPLETED | OUTPATIENT
Start: 2024-11-22 | End: 2024-11-22

## 2024-11-22 RX ORDER — PROCHLORPERAZINE EDISYLATE 5 MG/ML
5 INJECTION INTRAMUSCULAR; INTRAVENOUS
Status: COMPLETED | OUTPATIENT
Start: 2024-11-22 | End: 2024-11-22

## 2024-11-22 RX ORDER — SODIUM CHLORIDE 0.9 % (FLUSH) 0.9 %
5-40 SYRINGE (ML) INJECTION EVERY 12 HOURS SCHEDULED
Status: DISCONTINUED | OUTPATIENT
Start: 2024-11-22 | End: 2024-11-22 | Stop reason: HOSPADM

## 2024-11-22 RX ORDER — SODIUM CHLORIDE 9 MG/ML
INJECTION, SOLUTION INTRAVENOUS PRN
Status: DISCONTINUED | OUTPATIENT
Start: 2024-11-22 | End: 2024-11-22 | Stop reason: HOSPADM

## 2024-11-22 RX ORDER — LIDOCAINE HYDROCHLORIDE 10 MG/ML
INJECTION, SOLUTION EPIDURAL; INFILTRATION; INTRACAUDAL; PERINEURAL
Status: DISCONTINUED | OUTPATIENT
Start: 2024-11-22 | End: 2024-11-22 | Stop reason: SDUPTHER

## 2024-11-22 RX ORDER — MIDAZOLAM HYDROCHLORIDE 2 MG/2ML
2 INJECTION, SOLUTION INTRAMUSCULAR; INTRAVENOUS
Status: COMPLETED | OUTPATIENT
Start: 2024-11-22 | End: 2024-11-22

## 2024-11-22 RX ORDER — FENTANYL CITRATE 50 UG/ML
25 INJECTION, SOLUTION INTRAMUSCULAR; INTRAVENOUS EVERY 5 MIN PRN
Status: DISCONTINUED | OUTPATIENT
Start: 2024-11-22 | End: 2024-11-22 | Stop reason: HOSPADM

## 2024-11-22 RX ORDER — ROCURONIUM BROMIDE 10 MG/ML
INJECTION, SOLUTION INTRAVENOUS
Status: DISCONTINUED | OUTPATIENT
Start: 2024-11-22 | End: 2024-11-22 | Stop reason: SDUPTHER

## 2024-11-22 RX ORDER — PROPOFOL 10 MG/ML
INJECTION, EMULSION INTRAVENOUS
Status: DISCONTINUED | OUTPATIENT
Start: 2024-11-22 | End: 2024-11-22 | Stop reason: SDUPTHER

## 2024-11-22 RX ORDER — FENTANYL CITRATE 50 UG/ML
50 INJECTION, SOLUTION INTRAMUSCULAR; INTRAVENOUS EVERY 5 MIN PRN
Status: DISCONTINUED | OUTPATIENT
Start: 2024-11-22 | End: 2024-11-22 | Stop reason: HOSPADM

## 2024-11-22 RX ORDER — NALOXONE HYDROCHLORIDE 0.4 MG/ML
INJECTION, SOLUTION INTRAMUSCULAR; INTRAVENOUS; SUBCUTANEOUS PRN
Status: DISCONTINUED | OUTPATIENT
Start: 2024-11-22 | End: 2024-11-22 | Stop reason: HOSPADM

## 2024-11-22 RX ORDER — DIPHENHYDRAMINE HYDROCHLORIDE 50 MG/ML
12.5 INJECTION INTRAMUSCULAR; INTRAVENOUS
Status: DISCONTINUED | OUTPATIENT
Start: 2024-11-22 | End: 2024-11-22 | Stop reason: HOSPADM

## 2024-11-22 RX ORDER — LIDOCAINE HYDROCHLORIDE 10 MG/ML
1 INJECTION, SOLUTION EPIDURAL; INFILTRATION; INTRACAUDAL; PERINEURAL
Status: DISCONTINUED | OUTPATIENT
Start: 2024-11-22 | End: 2024-11-22 | Stop reason: HOSPADM

## 2024-11-22 RX ADMIN — CEFAZOLIN 3 G: 1 INJECTION, POWDER, FOR SOLUTION INTRAMUSCULAR; INTRAVENOUS at 10:22

## 2024-11-22 RX ADMIN — MIDAZOLAM 2 MG: 1 INJECTION INTRAMUSCULAR; INTRAVENOUS at 09:31

## 2024-11-22 RX ADMIN — SUGAMMADEX 200 MG: 100 INJECTION, SOLUTION INTRAVENOUS at 10:49

## 2024-11-22 RX ADMIN — SODIUM CHLORIDE, POTASSIUM CHLORIDE, SODIUM LACTATE AND CALCIUM CHLORIDE: 600; 310; 30; 20 INJECTION, SOLUTION INTRAVENOUS at 08:46

## 2024-11-22 RX ADMIN — PROCHLORPERAZINE EDISYLATE 5 MG: 5 INJECTION INTRAMUSCULAR; INTRAVENOUS at 11:25

## 2024-11-22 RX ADMIN — ROCURONIUM BROMIDE 50 MG: 10 INJECTION, SOLUTION INTRAVENOUS at 10:21

## 2024-11-22 RX ADMIN — DEXAMETHASONE SODIUM PHOSPHATE 10 MG: 10 INJECTION, SOLUTION INTRAMUSCULAR; INTRAVENOUS at 10:26

## 2024-11-22 RX ADMIN — ONDANSETRON 4 MG: 2 INJECTION INTRAMUSCULAR; INTRAVENOUS at 10:26

## 2024-11-22 RX ADMIN — LIDOCAINE HYDROCHLORIDE 50 MG: 10 INJECTION, SOLUTION EPIDURAL; INFILTRATION; INTRACAUDAL; PERINEURAL at 10:21

## 2024-11-22 RX ADMIN — FENTANYL CITRATE 100 MCG: 0.05 INJECTION, SOLUTION INTRAMUSCULAR; INTRAVENOUS at 10:21

## 2024-11-22 RX ADMIN — ONDANSETRON 4 MG: 2 INJECTION INTRAMUSCULAR; INTRAVENOUS at 11:13

## 2024-11-22 RX ADMIN — PROPOFOL 200 MG: 10 INJECTION, EMULSION INTRAVENOUS at 10:21

## 2024-11-22 ASSESSMENT — ENCOUNTER SYMPTOMS: SHORTNESS OF BREATH: 0

## 2024-11-22 ASSESSMENT — PAIN - FUNCTIONAL ASSESSMENT
PAIN_FUNCTIONAL_ASSESSMENT: NONE - DENIES PAIN

## 2024-11-22 ASSESSMENT — LIFESTYLE VARIABLES: SMOKING_STATUS: 0

## 2024-11-22 NOTE — OP NOTE
Operative Note      Patient: Faye Nguyen  YOB: 1980  MRN: [id number]    Date of Procedure: 11/22/2024    Pre-Op Diagnosis Codes:      * Preauricular cyst [Q18.1]    Post-Op Diagnosis: Same       Procedure(s):  PRE AURICULAR LESION  EXCISION    Surgeon(s):  Jaret Adams MD    Assistant:   * No surgical staff found *    Anesthesia: Choice    Estimated Blood Loss (mL): Minimal    Complications: None    Specimens:   ID Type Source Tests Collected by Time Destination   A : pre auricular cyst Tissue Face SURGICAL PATHOLOGY Jaret Adams MD 11/22/2024 1045        Implants:  * No implants in log *      Drains: * No LDAs found *    Findings:  Infection Present At Time Of Surgery (PATOS) (choose all levels that have infection present):  No infection present  Other Findings: Preauricular sinus left  This procedure was not performed to treat primary cutaneous melanoma through wide local excision    Detailed Description of Procedure:   After obtaining informed consent, the patient was taken to the operative room and placed op table supine position.  After induction of general endotracheal anesthesia the patient was prepped in standard fashion for excision of preauricular sinus on the left.  Once a timeout performed lacrimal probe was inserted and the area was injected around this.  Once this took effect incision was made elliptically around the lacrimal probe and dissection was carried deep to include the entire cyst.  It was then removed gently with Bovie and blunt dissection.  It was passed off the specimen and the wound was thoroughly irrigated and closed with multiple layers.  The patient was returned to anesthesia having suffered no complications.    Electronically signed by Jaret Adams MD on 11/22/2024 at 11:10 AM

## 2024-11-22 NOTE — ANESTHESIA POSTPROCEDURE EVALUATION
Department of Anesthesiology  Postprocedure Note    Patient: Faye Nguyen  MRN: [id number]  YOB: 1980  Date of evaluation: 11/22/2024    Procedure Summary       Date: 11/22/24 Room / Location: [address]    Anesthesia Start: 1016 Anesthesia Stop:     Procedure: PRE AURICULAR LESION  EXCISION (Left) Diagnosis:       Preauricular cyst      (Preauricular cyst [Q18.1])    Surgeons: Jaret Adams MD Responsible Provider: Mohini Weston APRN - CRNA    Anesthesia Type: General ASA Status: 3            Anesthesia Type: General    Ngoc Phase I: Ngoc Score: 10    Ngoc Phase II:      Anesthesia Post Evaluation    Patient location during evaluation: PACU  Patient participation: complete - patient participated  Level of consciousness: awake  Pain score: 0  Airway patency: patent  Nausea & Vomiting: no nausea and no vomiting  Cardiovascular status: hemodynamically stable  Respiratory status: spontaneous ventilation and room air  Hydration status: stable  Comments: Temp 97.6, RR 15, 130/75 (91), SPO2 96%, HR 84  VSS, pt sleepy by denies pain.  Report given to RN.   Pain management: adequate    No notable events documented.

## 2024-11-22 NOTE — DISCHARGE INSTRUCTIONS
Please call Dr. Adams with questions or concerns.  Pain meds as needed and take all antibiotics.  Ointment twice a day to the incision and she can get it wet in 3 days but do not scrub the area.  Follow-up with the Monday after Thanksgiving for suture removal.  Please call for an appointment.

## 2024-11-22 NOTE — ANESTHESIA PRE PROCEDURE
Department of Anesthesiology  Preprocedure Note       Name:  Faye Nguyen   Age:  44 y.o.  :  1980                                          MRN:  [id number]         Date:  2024      Surgeon: Surgeon(s):  Jaret Adams MD    Procedure: Procedure(s):  NASAL LESION BIOPSY EXCISION    Medications prior to admission:   Prior to Admission medications    Medication Sig Start Date End Date Taking? Authorizing Provider   amoxicillin-clavulanate (AUGMENTIN) 875-125 MG per tablet Take 1 tablet by mouth 2 times daily for 10 days 24 Yes Jaret Adams MD   levothyroxine (SYNTHROID) 137 MCG tablet Take 1 tablet by mouth Daily Indications: Underactive Thyroid   Yes Joe Dunham MD   ZEPBOUND 2.5 MG/0.5ML SOAJ once a week EVERY 24  Yes Joe Dunham MD   esomeprazole Magnesium (NEXIUM) 20 MG PACK Take 1 packet by mouth daily as needed   Yes Joe Dunham MD   oxyCODONE-acetaminophen (ENDOCET) 5-325 MG per tablet Take 1 tablet by mouth every 6 hours as needed for Pain for up to 3 days. Intended supply: 3 days. Take lowest dose possible to manage pain Max Daily Amount: 4 tablets  Patient not taking: Reported on 2024  Jaret Adams MD   EPINEPHrine (EPIPEN) 0.3 MG/0.3ML SOAJ injection ADMINISTER 0.3 ML IN THE MUSCLE 1 TIME AS NEEDED FOR ANAPHYLACTIC REACTION. MAY REPEAT IN 15 MINUTES AS NEEDED 12/15/21   ProviderJoe MD       Current medications:    Current Facility-Administered Medications   Medication Dose Route Frequency Provider Last Rate Last Admin    lactated ringers infusion   IntraVENous Continuous Opal Paige  mL/hr at 24 0846 New Bag at 24 0846       Allergies:    Allergies   Allergen Reactions    Maltodextrin Hugo [Dextrin] Anaphylaxis       Problem List:    Patient Active Problem List   Diagnosis Code    Preauricular cyst Q18.1       Past Medical History:        Diagnosis Date    GERD

## 2024-11-22 NOTE — BRIEF OP NOTE
Brief Postoperative Note      Patient: Faye Nguyen  YOB: 1980  MRN: [id number]    Date of Procedure: 11/22/2024    Pre-Op Diagnosis Codes:      * Preauricular cyst [Q18.1]    Post-Op Diagnosis: Same       Procedure(s):  PRE AURICULAR LESION  EXCISION    Surgeon(s):  Jaret Adams MD    Assistant:  * No surgical staff found *    Anesthesia: Choice    Estimated Blood Loss (mL): Minimal    Complications: None    Specimens:   ID Type Source Tests Collected by Time Destination   A : pre auricular cyst Tissue Face SURGICAL PATHOLOGY Jaret dAams MD 11/22/2024 1045        Implants:  * No implants in log *      Drains: * No LDAs found *    Findings:  Infection Present At Time Of Surgery (PATOS) (choose all levels that have infection present):  No infection present  Other Findings: Preauricular sinus left  This procedure was not performed to treat primary cutaneous melanoma through wide local excision    Electronically signed by Jaret Adams MD on 11/22/2024 at 11:12 AM

## 2024-11-22 NOTE — INTERVAL H&P NOTE
Update History & Physical    The patient's History and Physical of November 6, 2024 was reviewed with the patient and I examined the patient. There was no change. The surgical site was confirmed by the patient and me.     Plan: The risks, benefits, expected outcome, and alternative to the recommended procedure have been discussed with the patient. Patient understands and wants to proceed with the procedure.     Electronically signed by Jaret Adams MD on 11/22/2024 at 9:48 AM

## 2024-11-25 ENCOUNTER — TELEPHONE (OUTPATIENT)
Dept: ENT CLINIC | Age: 44
End: 2024-11-25

## 2024-11-25 NOTE — TELEPHONE ENCOUNTER
Faye called to schedule an appointment for  suture removal . Per pt needs to be seen on Monday 12/2. [facility] was unable to accommodate in the time frame needed. Please be advised that the best time to call Anytime.    Thank you.

## 2024-11-25 NOTE — TELEPHONE ENCOUNTER
Attempted to contact patient regarding questions she had about her procedure but patient did not answer. LVM for patient to contact our office.

## 2024-12-02 ENCOUNTER — OFFICE VISIT (OUTPATIENT)
Dept: ENT CLINIC | Age: 44
End: 2024-12-02
Payer: COMMERCIAL

## 2024-12-02 VITALS
WEIGHT: 280 LBS | DIASTOLIC BLOOD PRESSURE: 82 MMHG | HEIGHT: 72 IN | SYSTOLIC BLOOD PRESSURE: 128 MMHG | BODY MASS INDEX: 37.93 KG/M2

## 2024-12-02 DIAGNOSIS — Q18.1 PREAURICULAR CYST: Primary | ICD-10-CM

## 2024-12-02 PROCEDURE — 99213 OFFICE O/P EST LOW 20 MIN: CPT | Performed by: OTOLARYNGOLOGY

## 2024-12-02 ASSESSMENT — ENCOUNTER SYMPTOMS
ALLERGIC/IMMUNOLOGIC NEGATIVE: 1
EYES NEGATIVE: 1
GASTROINTESTINAL NEGATIVE: 1
RESPIRATORY NEGATIVE: 1

## 2024-12-02 NOTE — PROGRESS NOTES
2024    Faye Nguyen (:  1980) is a 44 y.o. female, Established patient, here for evaluation of the following chief complaint(s):  Post-Op Check (Cyst removal suture excision )      Vitals:    24 0851   BP: 128/82   Weight: 127 kg (280 lb)   Height: 1.854 m (6' 1\")       Wt Readings from Last 3 Encounters:   24 127 kg (280 lb)   24 126.6 kg (279 lb)   24 135.6 kg (299 lb)       BP Readings from Last 3 Encounters:   24 128/82   24 120/75   10/10/24 130/78         SUBJECTIVE/OBJECTIVE:    Patient seen today for her left preauricular region.  I removed the epidermal inclusion cyst last week and she is doing well.  Here today for stitches removal.        Review of Systems   Constitutional: Negative.    HENT: Negative.     Eyes: Negative.    Respiratory: Negative.     Cardiovascular: Negative.    Gastrointestinal: Negative.    Endocrine: Negative.    Musculoskeletal: Negative.    Skin: Negative.    Allergic/Immunologic: Negative.    Neurological: Negative.    Hematological: Negative.    Psychiatric/Behavioral: Negative.          Physical Exam  Vitals reviewed.   Constitutional:       Appearance: Normal appearance. She is normal weight.   HENT:      Head: Normocephalic and atraumatic.      Right Ear: Tympanic membrane, ear canal and external ear normal.      Left Ear: Tympanic membrane, ear canal and external ear normal.      Ears:        Comments: Clean dry and intact stitches removed     Nose: Nose normal.      Mouth/Throat:      Mouth: Mucous membranes are moist.      Pharynx: Oropharynx is clear.   Eyes:      Extraocular Movements: Extraocular movements intact.      Pupils: Pupils are equal, round, and reactive to light.   Cardiovascular:      Rate and Rhythm: Normal rate and regular rhythm.   Pulmonary:      Effort: Pulmonary effort is normal.      Breath sounds: Normal breath sounds.   Musculoskeletal:      Cervical back: Normal range of motion.

## (undated) DEVICE — TOWEL,OR,DSP,ST,BLUE,DLX,4/PK,20PK/CS: Brand: MEDLINE

## (undated) DEVICE — GAUZE,SPONGE,4"X4",8PLY,STRL,LF,10/TRAY: Brand: MEDLINE

## (undated) DEVICE — SUTURE ETHILON SZ 6-0 L18IN NONABSORBABLE BLK L13MM P-3 3/8 1698G

## (undated) DEVICE — TUBE ET 7MM NSL ORAL BASIC CUF INTMED MURPHY EYE RADPQ MRK

## (undated) DEVICE — ELECTRODE ES AD PED L2.5IN TEF INSUL MOD NONCORDED BLDE TIP

## (undated) DEVICE — SUTURE ETHILON SZ 5-0 L18IN NONABSORBABLE BLK L13MM P-3 3/8 698H

## (undated) DEVICE — PUNCH SURG DIA3MM DISP FOR SKIN BX ACUPNCH 25 PER BX

## (undated) DEVICE — Device

## (undated) DEVICE — CURAVIEW LED LARYNGOSCOPE BLADE & HANDLE,DISPOSABLE,MAC 3.5: Brand: CURAPLEX

## (undated) DEVICE — GLOVE SURG SZ 7 CRM LTX FREE POLYISOPRENE POLYMER BEAD ANTI

## (undated) DEVICE — DRESSING PETROLATUM 2X2IN NON ADH ABSORB

## (undated) DEVICE — SUTURE VICRYL + SZ 4 0 L18IN ABSRB WHT P 3 3 8 CIR REV CUT NDL VCP494G